# Patient Record
Sex: MALE | Race: BLACK OR AFRICAN AMERICAN
[De-identification: names, ages, dates, MRNs, and addresses within clinical notes are randomized per-mention and may not be internally consistent; named-entity substitution may affect disease eponyms.]

---

## 2020-09-04 ENCOUNTER — HOSPITAL ENCOUNTER (EMERGENCY)
Dept: HOSPITAL 88 - FSED | Age: 40
Discharge: HOME | End: 2020-09-04
Payer: SELF-PAY

## 2020-09-04 VITALS — WEIGHT: 200 LBS | BODY MASS INDEX: 23.14 KG/M2 | HEIGHT: 78 IN

## 2020-09-04 DIAGNOSIS — R94.31: ICD-10-CM

## 2020-09-04 DIAGNOSIS — I88.9: ICD-10-CM

## 2020-09-04 DIAGNOSIS — M35.3: ICD-10-CM

## 2020-09-04 DIAGNOSIS — R07.89: Primary | ICD-10-CM

## 2020-09-04 DIAGNOSIS — F17.210: ICD-10-CM

## 2020-09-04 PROCEDURE — 93005 ELECTROCARDIOGRAM TRACING: CPT

## 2020-09-04 PROCEDURE — 80053 COMPREHEN METABOLIC PANEL: CPT

## 2020-09-04 PROCEDURE — 99283 EMERGENCY DEPT VISIT LOW MDM: CPT

## 2020-09-04 PROCEDURE — 84484 ASSAY OF TROPONIN QUANT: CPT

## 2020-09-04 PROCEDURE — 71046 X-RAY EXAM CHEST 2 VIEWS: CPT

## 2020-09-04 PROCEDURE — 82553 CREATINE MB FRACTION: CPT

## 2020-09-04 PROCEDURE — 80076 HEPATIC FUNCTION PANEL: CPT

## 2020-09-04 PROCEDURE — 83880 ASSAY OF NATRIURETIC PEPTIDE: CPT

## 2020-09-04 PROCEDURE — 85025 COMPLETE CBC W/AUTO DIFF WBC: CPT

## 2020-09-04 NOTE — DIAGNOSTIC IMAGING REPORT
EXAMINATION:  CXR 2 VIEW - HOPD    



INDICATION:      

^[id number]

^0410



COMPARISON:  None

     

FINDINGS:  PA and lateral views



TUBES and LINES:  None.



LUNGS:  Lungs are well inflated.  There is no evidence of pneumonia or

pulmonary edema.



PLEURA:  No pleural effusion or pneumothorax.



HEART AND MEDIASTINUM:  The cardiomediastinal silhouette is unremarkable.    



BONES AND SOFT TISSUES:  No acute osseous lesion.  Soft tissues are

unremarkable.



UPPER ABDOMEN: No free air under the diaphragm.    



IMPRESSION: 

No acute thoracic abnormality.





Signed by: Dr. Thierno Courtney MD on 9/4/2020 4:28 AM

## 2020-09-04 NOTE — EMERGENCY DEPARTMENT NOTE
History of Present Illnes


History of Present Illness


Chief Complaint:  left Chest Pain


History of Present Illness


This is a 39 year old  male. was doing well until 8 months ago then rgt neck 

painful lymphnode pain, bodyaches, back/left shoulder pain, intermittent 

abdominal pain, then pcp had an ekg, labb work and cxr were all negative.


Historian:  Patient


Arrival Mode:  Car


History limited by:  condition of the patient (normal)


 Required:  No


Onset (how long ago):  day(s) (4)


Location:  cp


Quality:  sharp


Radiation:  Reports non-radiation


Severity:  moderate


Onset quality:  gradual


Duration (how long):  day(s) (4)


Timing of current episode:  intermittent


Progression:  waxing and waning


Chronicity:  recurrent


Context:  Denies recent illness, Denies recent surgery, Denies recent 

immobilization, Denies recent travel, Denies trauma/injury, Denies new 

medications, Denies hx of DVT/PE, Denies non-compliance w/ medications


Relieving factors:  none


Exacerbating factors:  movement


Associated symptoms:  Reports denies other symptoms


Treatments prior to arrival:  none





Past Medical/Family History


Physician Review


I have reviewed the patient's past medical and family history.  Any updates have

been documented here.





Past Medical History


Recent Fever:  No


Clinical Suspicion of Infectio:  No


New/Unexplained Change in Ment:  No


Past Medical History:  None


Past Surgical History:  None





Social History


Smoking Cessation:  Current some day smoker


Counseling Performed:  No


Alcohol Use:  Social


Any Illegal Drug Use:  Yes (CANNIBIS)


TB Exposure/Symptoms:  No


Physically hurt or threatened:  No





Family History


Family history of heart diseas:  No





Other


Any Pre-Existing Lines (PICC,:  No


Is patient up to date on immun:  No





Review of Systems


Review of Systems


Constitutional:  Reports no symptoms


EENTM:  Reports no symptoms


Cardiovascular:  Reports as per HPI


Respiratory:  Reports no symptoms


Gastrointestinal:  Reports no symptoms


Genitourinary:  Reports no symptoms


Musculoskeletal:  Reports as per HPI


Integumentary:  Reports no symptoms


Neurological:  Reports no symptoms


Psychological:  Reports no symptoms


Endocrine:  Reports no symptoms


Hematological/Lymphatic:  Reports no symptoms


Review of other systems:  All other systems negative





Physical Exam


Related Data


Allergies:  


Coded Allergies:  


     No Known Allergies (Unverified , 20)


Triage Vital Signs





Vital Signs








  Date Time  Temp Pulse Resp B/P (MAP) Pulse Ox O2 Delivery O2 Flow Rate FiO2


 


20 03:40 98.4 77 18 113/77 99 Room Air  








Vital signs reviewed:  Yes





Physical Exam


CONSTITUTIONAL





Constitutional:  Present well-developed, Present well-nourished


HENT


HENT:  Present normocephalic, Present atraumatic, Present oropharynx 

clear/moist, Present nose normal


HENT L/R:  Present left ext ear normal, Present right ext ear normal


EYES





Eyes:  Reports PERRL, Reports conjunctivae normal


NECK


Neck:  Present ROM normal, Present supple, Present cervical adenopathy (rgt 

anterior)


PULMONARY


Pulmonary:  Present effort normal, Present breath sounds normal


CARDIOVASCULAR





Cardiovascular:  Present regular rhythm, Present heart sounds normal, Present 

capillary refill normal, Present normal rate


GASTROINTESTINAL





Abdominal:  Present soft, Present nontender, Present bowel sounds normal


GENITOURINARY





Genitourinary:  Present exam deferred


SKIN


Skin:  Present warm, Present dry


MUSCULOSKELETAL





Musculoskeletal:  Present ROM normal


NEUROLOGICAL





Neurological:  Present alert, Present oriented x 3, Present no gross motor or 

sensory deficits


PSYCHOLOGICAL


Psychological:  Present mood/affect normal, Present judgement normal





Results


Laboratory


Lab results reviewed:  Yes


Laboratory comments


cbc normal, bmp normal, lft normal, cardiac enzymes normal, bnp normal





Imaging


Imaging results reviewed:  Yes


Impressions


                        [place] [address]








Patient Name: HOWARD LAMB                          MR #: [id number]  


           


: 1980                         Age/Sex: 39/M


Acct #: [id number]                    Req #: [id number]


Adm Physician:                                      


Ordered by: YESENIA NEVILLE                     Report #: [id number] 


Location: [facility]                          Room/Bed:           


                                                                                


________________________________________________________________________________


___________________





Procedure: [id number] HOPD/CXR 2 VIEW - HOPD


Exam Date: 20                            Exam Time: 0410








                              REPORT STATUS: Signed





EXAMINATION:  CXR 2 VIEW - HOPD    





INDICATION:      


^[id number]


^0410





COMPARISON:  None


     


FINDINGS:  PA and lateral views





TUBES and LINES:  None.





LUNGS:  Lungs are well inflated.  There is no evidence of pneumonia or


pulmonary edema.





PLEURA:  No pleural effusion or pneumothorax.





HEART AND MEDIASTINUM:  The cardiomediastinal silhouette is unremarkable.    





BONES AND SOFT TISSUES:  No acute osseous lesion.  Soft tissues are


unremarkable.





UPPER ABDOMEN: No free air under the diaphragm.    





IMPRESSION: 


No acute thoracic abnormality.








Signed by: Dr. Thierno Courtney MD on 2020 4:28 AM








Dictated By: THIERNO COURTNEY MD


Electronically Signed By: THIERNO COURTNEY MD on 20


Transcribed By: YANA on 20 








COPY TO:   YESENIA NEVILLE~





Procedures


12 Lead ECG Interpretation


ECG Interpretation :  


   ECG:  ECG 1


   Date:  Sep 4, 2020


   Time:  05:10


   Rhythm:  sinus bradycardia


   Rate:  bradycardia


   BPM:  51


   QRS axis:  normal


   Conduction:  incomplete RBBB


   ST segments normal:  Yes


   T waves normal:  Yes


   Clinical Impression:  abnormal ECG





Assessment & Plan


Medical Decision Making


MDM


see above





Assessment & Plan


Final Impression:  


(1) Atypical chest pain


(2) Polyarthralgia


(3) Polymyalgia


(4) Cervical lymphadenitis


Depart Disposition:  HOME, SELF-CARE


Last Vital Signs











  Date Time  Temp Pulse Resp B/P (MAP) Pulse Ox O2 Delivery O2 Flow Rate FiO2


 


20 03:40 98.4 77 18 113/77 99 Room Air  








Home Meds


Active Scripts


Doxycycline Hyclate (DOXYCYCLINE HYCLATE) 100 Mg Capsule, 100 MG PO Q12H, #20 

CAP


   Prov:YESENIA NEVILLE         20


Amoxicillin/Potassium Clav (AUGMENTIN 875-125 TABLET) 1 Each Tablet, 875 MG PO 

Q12H, #20 TAB


   Prov:YESENIA NEVILLE         20


Prednisone (PREDNISONE) 20 Mg Tab, 80 MG PO DAILY, #20 TAB


   take 4  20 mg pills all at once


   Prov:YESENIA NEVILLE         20


Medications in the ED





Sodium Chloride 10 ml PRN  PRN INJ IV SITE FLUSH;  Start 20 at 03:45;  Stop 

10/4/20 at 03:44











YESENIA NEVILLE               Sep 4, 2020 04:37

## 2020-09-04 NOTE — XMS REPORT
Continuity of Care Document

                             Created on: 2020



HOWARD LAMB

External Reference #: [id number]

: 1980

Sex: Male



Demographics





                          Address                   [address]

[place]  [address]

 

                          Home Phone                [phone]

 

                          Preferred Language        English

 

                          Marital Status            Unknown

 

                          Catholic Affiliation     BAP

 

                          Race                      Unknown

 

                          Additional Race(s)        Black or 



 

                          Ethnic Group              Unknown





Author





                          Author                    [facility]

 

                          Organization              [facility]

 

                          Address                   [address] [place] [address] [address]

[place]  [address]



 

                          Phone                     Unavailable







Support





                Name            Relationship    Address         Phone

 

                Nancy Hall ECON            Unknown         [phone]

[phone]

 

                Vik Mann     ECON            Unknown         [phone]

 

                Vik Mann     ECON            Unknown         [phone]







Care Team Providers





                    Care Team Member Name Role                Phone

 

                    [facility] Attphys             [phone]

 

                    Doctor Unassigned, Name No Attphys             Unavailable







Problems

This patient has no known problems.



Allergies, Adverse Reactions, Alerts

This patient has no known allergies or adverse reactions.



Medications

This patient has no known medications.



Procedures

This patient has no known procedures.



Encounters





             Start Date/Time End Date/Time Encounter Type Admission Type Attendi

[facility]   Care Department Encounter ID    Source

 

           2020 16:15:30 2020 16:30:30 Urgent Care            Mk galdamez [facility] [id number] 

[id number]                                 

 

             2020 00:00:00 2020 00:00:00 Orders Only               D

octor Unassigned, No Name

                [facility] [id number]

[id number]         







Results

This patient has no known results.

## 2020-09-24 ENCOUNTER — HOSPITAL ENCOUNTER (EMERGENCY)
Dept: HOSPITAL 88 - FSED | Age: 40
Discharge: HOME | End: 2020-09-24
Payer: SELF-PAY

## 2020-09-24 VITALS — BODY MASS INDEX: 23.14 KG/M2 | HEIGHT: 78 IN | WEIGHT: 200 LBS

## 2020-09-24 DIAGNOSIS — R59.9: Primary | ICD-10-CM

## 2020-09-24 DIAGNOSIS — K11.5: ICD-10-CM

## 2020-09-24 DIAGNOSIS — M54.2: ICD-10-CM

## 2020-09-24 PROCEDURE — 99284 EMERGENCY DEPT VISIT MOD MDM: CPT

## 2020-09-24 PROCEDURE — 71250 CT THORAX DX C-: CPT

## 2020-09-24 PROCEDURE — 70486 CT MAXILLOFACIAL W/O DYE: CPT

## 2020-09-24 NOTE — EMERGENCY DEPARTMENT NOTE
History of Present Illnes


History of Present Illness


Chief Complaint:  General Medicine Complaints


History of Present Illness


This is a 39 year old  male  Chief Complaint Comment RT SIDE OF 

NECK WITH LUMP THAT PT STATES IS A SWOLLEN LYMPH NODE SINCE NOVEMBER. PT SUPER 

PLEASANT AND COOPERATIVE. NO FEVERS SMOKES MARIJUANA. PT DENIES ANY MEDICAL HX. 

TAKES NO RX. PT STATES ACHES AND PAIN THROUGHTOUT BODY. PT AAOX4. AMBULATORY. 


.


Historian:  Patient, Family Member


Arrival Mode:  Car


Onset (how long ago):  month(s) (1)


Location:  neck


Quality:  pain


Radiation:  Reports neck


Severity:  moderate


Onset quality:  gradual


Duration (how long):  month(s) (1)


Timing of current episode:  constant


Progression:  unchanged


Chronicity:  new


Context:  Denies recent illness, Denies recent surgery, Denies recent 

immobilization, Denies recent travel, Denies trauma/injury, Denies new 

medications, Denies hx of DVT/PE, Denies non-compliance w/ medications, Denies 

other


Relieving factors:  none


Exacerbating factors:  none


Associated symptoms:  Denies denies other symptoms, Denies confusion, Denies 

chest pain, Denies cough, Denies diaphoresis, Denies fever/chills, Denies 

headaches, Denies loss of appetite, Denies malaise, Denies nausea/vomiting, 

Denies rash, Denies seizure, Denies shortness of breath, Denies syncope, Denies 

weakness, Denies other





Past Medical/Family History


Physician Review


I have reviewed the patient's past medical and family history.  Any updates have

been documented here.





Past Medical History


Recent Fever:  No


Clinical Suspicion of Infectio:  No


New/Unexplained Change in Ment:  No


Past Medical History:  None


Past Surgical History:  None





Social History


Smoking Cessation:  Never Smoker


Alcohol Use:  Occasional





Review of Systems


Review of Systems


Constitutional:  Reports no symptoms


EENTM:  Reports as per HPI


Cardiovascular:  Reports no symptoms


Respiratory:  Reports no symptoms


Gastrointestinal:  Reports no symptoms


Genitourinary:  Reports no symptoms


Musculoskeletal:  Reports no symptoms


Integumentary:  Reports no symptoms


Neurological:  Reports no symptoms


Psychological:  Reports no symptoms


Endocrine:  Reports no symptoms


Hematological/Lymphatic:  Reports no symptoms





Physical Exam


Related Data


Allergies:  


Coded Allergies:  


     No Known Allergies (Unverified , 9/4/20)


Triage Vital Signs





Vital Signs








  Date Time  Temp Pulse Resp B/P (MAP) Pulse Ox O2 Delivery O2 Flow Rate FiO2


 


9/24/20 15:12 98.2 85 16 128/80 100 Room Air  








Vital signs reviewed:  Yes





Physical Exam


CONSTITUTIONAL





Constitutional:  Present well-developed, Present well-nourished


HENT


HENT:  Present normocephalic, Present atraumatic, Present oropharynx 

clear/moist, Present nose normal, Present other (swelling right submandibular)


HENT L/R:  Present left ext ear normal, Present right ext ear normal


EYES





Eyes:  Reports PERRL, Reports conjunctivae normal


NECK


Neck:  Present ROM normal


PULMONARY


Pulmonary:  Present effort normal, Present breath sounds normal


CARDIOVASCULAR





Cardiovascular:  Present regular rhythm, Present heart sounds normal, Present 

capillary refill normal, Present normal rate


GASTROINTESTINAL





Abdominal:  Present soft, Present nontender, Present bowel sounds normal


GENITOURINARY





Genitourinary:  Present exam deferred


SKIN


Skin:  Present warm, Present dry


MUSCULOSKELETAL





Musculoskeletal:  Present ROM normal


NEUROLOGICAL





Neurological:  Present alert, Present oriented x 3, Present no gross motor or 

sensory deficits


PSYCHOLOGICAL


Psychological:  Present mood/affect normal, Present judgement normal





Results


Imaging


Imaging results reviewed:  Yes





Assessment & Plan


Medical Decision Making


MDM


lymphadenitis





Reassessment


Reassessment time:  16:49


Reassessment


better





Assessment & Plan


Final Impression:  


(1) Neck pain


(2) Salivary gland calculi


Depart Disposition:  HOME, SELF-CARE


Last Vital Signs











  Date Time  Temp Pulse Resp B/P (MAP) Pulse Ox O2 Delivery O2 Flow Rate FiO2


 


9/24/20 15:12 98.2 85 16 128/80 100 Room Air  








Home Meds


Active Scripts


Doxycycline Hyclate (DOXYCYCLINE HYCLATE) 100 Mg Capsule, 100 MG PO Q12H, #20 

CAP


   Prov:YESENIA NEVILLE         9/4/20


Amoxicillin/Potassium Clav (AUGMENTIN 875-125 TABLET) 1 Each Tablet, 875 MG PO 

Q12H, #20 TAB


   Prov:YESENIA NEVILLE         9/4/20


Prednisone (PREDNISONE) 20 Mg Tab, 80 MG PO DAILY, #20 TAB


   take 4  20 mg pills all at once


   Prov:YESENIA NEVILLE         9/4/20











AROLDO ATKINS MD             Sep 24, 2020 16:50

## 2020-09-24 NOTE — DIAGNOSTIC IMAGING REPORT
EXAM: CT Chest WITHOUT intravenous contrast 9/24/2020 3:42 PM

INDICATION: 

^sob

COMPARISON: X-ray dated 9/4/2020

TECHNIQUE:

Chest was scanned utilizing a multidetector helical scanner from the lung apex

through the level of the adrenal glands without administration of IV contrast.

Coronal and sagittal reformations were obtained. Routine protocol was

performed.



IV CONTRAST: None

RADIATION DOSE: Total DLP: 721 mGy*cm. Dose modulation, iterative

reconstruction, and/or weight based adjustment of the mA/kV was utilized to

reduce the radiation dose to as low as reasonably achievable. 

COMPLICATIONS: None



FINDINGS:



LINES/ TUBES: None.



LUNGS AND AIRWAYS:  Large airways are patent.  Lungs are clear without focal

consolidation. No suspicious pulmonary nodule or mass is identified.



PLEURA: The pleural spaces are clear.



HEART AND MEDIASTINUM: The thyroid gland is normal.  No mediastinal, hilar or

axillary lymphadenopathy.  The heart is normal in size.. There is no

pericardial effusion. Air is identified within the mid and distal esophagus,

nonspecific.



UPPER ABDOMEN: Unremarkable



BONES: Negative for acute osseous abnormality. No suspicious lytic or blastic

lesion is identified.



SOFT TISSUES: Unremarkable.



IMPRESSION: 

Unremarkable CT of the chest without contrast. No suspicious opacity or

pulmonary nodule.

Question mild reflux with air in the mid and distal esophagus.



Signed by: Cristiano Jalloh MD on 9/24/2020 4:23 PM

## 2020-09-24 NOTE — DIAGNOSTIC IMAGING REPORT
EXAMINATION: CT of the neck without contrast     



HISTORY: Right lump/lymph node in the neck

COMPARISON: None 

TECHNIQUE: Multidetector helical axial  images were obtained from the sternal

notch through the skull base without contrast.  

Dose modulation, iterative reconstruction, and/or weight based adjustment of

the mA/kV was utilized to reduce the radiation dose to as low as reasonably

achievable.



FINDINGS:



     Mass: 

-No solid or cystic masses seen. Approximately 2.3 x 1.6 cm calcification in

the right submandibular region near the hilum of the right submandibular gland.

No discrete associated inflammatory changes or fluid collections around the

calculus. No definite dilatation of the submandibular gland duct at this time.

-Possible tiny thyroglossal duct cyst at the level of the hyoid.



     Nodes: No discrete enlarge lymphadenopathy in this unenhanced study.  

     Sinuses: Imaged portions unremarkable.

     Oral cavity: No abnormalities in this unenhanced study.   

     Salivary glands: Parotid and submandibular glands are otherwise

unremarkable.       

     Pharynx: No abnormalities in this unenhanced study

     Larynx: No abnormalities in this unenhanced study

     Thyroid gland: Hypoattenuating gland may represent the sequela from prior

thyroiditis.

     Upper esophagus: Unremarkable.    

     Blood vessels: Cannot be evaluated due to lack of IV contrast.  

     Bones: Unremarkable.  



IMPRESSION:



Right submandibular gland sialolith, the large calculus is not associated to

inflammatory changes or fluid collections at this time.



Signed by: Dr. Ekaterina Flanagan M.D. on 9/24/2020 4:36 PM